# Patient Record
Sex: FEMALE | Race: WHITE | Employment: OTHER | URBAN - METROPOLITAN AREA
[De-identification: names, ages, dates, MRNs, and addresses within clinical notes are randomized per-mention and may not be internally consistent; named-entity substitution may affect disease eponyms.]

---

## 2017-06-27 ENCOUNTER — HOSPITAL ENCOUNTER (OUTPATIENT)
Dept: ULTRASOUND IMAGING | Age: 42
Discharge: HOME | End: 2017-06-27
Attending: NURSE PRACTITIONER
Payer: COMMERCIAL

## 2017-06-27 DIAGNOSIS — N39.0 RECURRENT UTI: ICD-10-CM

## 2017-06-27 DIAGNOSIS — R31.0 GROSS HEMATURIA: ICD-10-CM

## 2017-06-27 DIAGNOSIS — Z87.442 HISTORY OF KIDNEY STONES: ICD-10-CM

## 2017-06-27 PROCEDURE — 76770 US EXAM ABDO BACK WALL COMP: CPT

## 2017-06-27 NOTE — PROGRESS NOTES
MICHAELLE w/o obvious cause of recurrent UTI. I would recommend probiotic daily and she may consider cranberry tablets 2 times daily. Call for spec check if UTI sx occur. There was a right renal stone. It is stable. No intervention needed at this time. There was also a complex renal cyst. It is small. Recommendation is to repeat MICHAELLE in 1 year. We can assess the kidney stone again at that time. RTO in 1 year for f/u with MICHAELLE prior (order in EMR).  She may see me or Dr. Karishma Valencia, who she saw in the past.